# Patient Record
Sex: FEMALE | Race: BLACK OR AFRICAN AMERICAN | NOT HISPANIC OR LATINO | Employment: FULL TIME | URBAN - METROPOLITAN AREA
[De-identification: names, ages, dates, MRNs, and addresses within clinical notes are randomized per-mention and may not be internally consistent; named-entity substitution may affect disease eponyms.]

---

## 2023-03-15 LAB
APPEARANCE, URINE: ABNORMAL
BACTERIA, URINE: ABNORMAL /HPF
BILIRUBIN, URINE: NEGATIVE
BLOOD, URINE: ABNORMAL
COLOR, URINE: YELLOW
GLUCOSE, URINE: NEGATIVE MG/DL
KETONES, URINE: NEGATIVE MG/DL
LEUKOCYTE ESTERASE, URINE: ABNORMAL
NITRITE, URINE: NEGATIVE
PH, URINE: 5 (ref 5–8)
PROTEIN, URINE: NEGATIVE MG/DL
RBC, URINE: <1 /HPF (ref 0–5)
SPECIFIC GRAVITY, URINE: 1.02 (ref 1–1.03)
SQUAMOUS EPITHELIAL CELLS, URINE: 2 /HPF
UROBILINOGEN, URINE: <2 MG/DL (ref 0–1.9)
WBC, URINE: 2 /HPF (ref 0–5)

## 2023-03-16 LAB
CHLAMYDIA TRACH., AMPLIFIED: NEGATIVE
N. GONORRHEA, AMPLIFIED: NEGATIVE
TRICHOMONAS VAGINALIS: NEGATIVE
URINE CULTURE: NORMAL

## 2023-03-23 ENCOUNTER — TELEPHONE (OUTPATIENT)
Dept: PRIMARY CARE | Facility: CLINIC | Age: 38
End: 2023-03-23
Payer: COMMERCIAL

## 2023-03-23 DIAGNOSIS — F40.243 FEAR OF FLYING: Primary | ICD-10-CM

## 2023-03-23 RX ORDER — SEMAGLUTIDE 0.25 MG/.5ML
0.25 INJECTION, SOLUTION SUBCUTANEOUS
COMMUNITY
Start: 2023-01-17 | End: 2023-05-09 | Stop reason: ALTCHOICE

## 2023-03-23 RX ORDER — LORAZEPAM 0.5 MG/1
TABLET ORAL
Qty: 26 TABLET | Refills: 0 | Status: SHIPPED | OUTPATIENT
Start: 2023-03-23 | End: 2023-05-09 | Stop reason: ALTCHOICE

## 2023-03-23 RX ORDER — IBUPROFEN 800 MG/1
800 TABLET ORAL 4 TIMES DAILY
COMMUNITY
Start: 2022-10-10 | End: 2023-05-09 | Stop reason: ALTCHOICE

## 2023-03-23 RX ORDER — LORATADINE 10 MG/1
10 TABLET ORAL DAILY
COMMUNITY
Start: 2019-11-01

## 2023-03-23 RX ORDER — SEMAGLUTIDE 1.34 MG/ML
0.25 INJECTION, SOLUTION SUBCUTANEOUS
COMMUNITY
Start: 2021-04-15 | End: 2023-05-09 | Stop reason: ALTCHOICE

## 2023-03-23 RX ORDER — NEBULIZER AND COMPRESSOR
EACH MISCELLANEOUS
COMMUNITY
Start: 2019-10-21 | End: 2023-05-09 | Stop reason: ALTCHOICE

## 2023-03-23 NOTE — TELEPHONE ENCOUNTER
I sent rx for a few tabs of ativan. Please let pt know. DO NOT Drive for 6 hours after taking ativan. Please let pt know of this. Thanks

## 2023-04-13 ENCOUNTER — TELEPHONE (OUTPATIENT)
Dept: PRIMARY CARE | Facility: CLINIC | Age: 38
End: 2023-04-13
Payer: COMMERCIAL

## 2023-04-13 DIAGNOSIS — J45.40 MODERATE PERSISTENT ASTHMATIC BRONCHITIS WITHOUT COMPLICATION (HHS-HCC): ICD-10-CM

## 2023-04-13 DIAGNOSIS — J01.00 ACUTE NON-RECURRENT MAXILLARY SINUSITIS: Primary | ICD-10-CM

## 2023-04-13 RX ORDER — CODEINE PHOSPHATE AND GUAIFENESIN 10; 100 MG/5ML; MG/5ML
5 SOLUTION ORAL EVERY 6 HOURS PRN
Qty: 240 ML | Refills: 0 | Status: SHIPPED | OUTPATIENT
Start: 2023-04-13 | End: 2023-04-20

## 2023-04-13 RX ORDER — AZITHROMYCIN 250 MG/1
TABLET, FILM COATED ORAL
Qty: 6 TABLET | Refills: 0 | Status: SHIPPED | OUTPATIENT
Start: 2023-04-13 | End: 2023-04-18

## 2023-04-13 RX ORDER — PREDNISONE 10 MG/1
TABLET ORAL
Qty: 42 TABLET | Refills: 0 | Status: SHIPPED | OUTPATIENT
Start: 2023-04-13 | End: 2023-04-25

## 2023-04-13 NOTE — TELEPHONE ENCOUNTER
Pt state that she have had ear and nasal drainage and a cough for 3 week . She states that her asthma is acting up. She tested - for covid 4/11. Please advised

## 2023-05-05 ENCOUNTER — TELEPHONE (OUTPATIENT)
Dept: PRIMARY CARE | Facility: CLINIC | Age: 38
End: 2023-05-05
Payer: COMMERCIAL

## 2023-05-05 NOTE — TELEPHONE ENCOUNTER
Pt states that she is 2 weeks post covid and cannot get rid of cough and she have asthma. She would like to know if there is anything that you can do for her

## 2023-05-08 NOTE — TELEPHONE ENCOUNTER
Pt states that her ins will not cover Symbicort at a reasonable price. She would like to know if you could send a order for budesonide nebulizer

## 2023-05-09 ENCOUNTER — OFFICE VISIT (OUTPATIENT)
Dept: PRIMARY CARE | Facility: CLINIC | Age: 38
End: 2023-05-09
Payer: COMMERCIAL

## 2023-05-09 VITALS
TEMPERATURE: 98 F | SYSTOLIC BLOOD PRESSURE: 160 MMHG | HEIGHT: 66 IN | BODY MASS INDEX: 43.71 KG/M2 | WEIGHT: 272 LBS | DIASTOLIC BLOOD PRESSURE: 84 MMHG | OXYGEN SATURATION: 99 % | HEART RATE: 95 BPM

## 2023-05-09 DIAGNOSIS — J45.41 MODERATE PERSISTENT ASTHMA WITH EXACERBATION (HHS-HCC): Primary | ICD-10-CM

## 2023-05-09 PROBLEM — K57.90 DIVERTICULOSIS: Status: ACTIVE | Noted: 2023-05-09
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
## 2023-05-09 PROBLEM — D64.9 ANEMIA: Status: ACTIVE | Noted: 2023-05-09

## 2023-05-09 PROBLEM — R53.83 FATIGUE: Status: ACTIVE | Noted: 2023-05-09

## 2023-05-09 PROBLEM — M25.569 KNEE PAIN: Status: ACTIVE | Noted: 2023-05-09

## 2023-05-09 PROBLEM — K27.9 PUD (PEPTIC ULCER DISEASE): Status: ACTIVE | Noted: 2023-05-09

## 2023-05-09 PROBLEM — R07.89 CHEST WALL PAIN: Status: ACTIVE | Noted: 2023-05-09

## 2023-05-09 PROBLEM — R11.0 NAUSEA: Status: ACTIVE | Noted: 2023-05-09

## 2023-05-09 PROBLEM — E04.9 GOITER: Status: ACTIVE | Noted: 2023-05-09

## 2023-05-09 PROBLEM — M62.830 BACK MUSCLE SPASM: Status: ACTIVE | Noted: 2023-05-09

## 2023-05-09 PROBLEM — R73.03 PRE-DIABETES: Status: ACTIVE | Noted: 2023-05-09

## 2023-05-09 PROBLEM — R63.5 WEIGHT GAIN: Status: ACTIVE | Noted: 2023-05-09

## 2023-05-09 PROBLEM — B96.89 BACTERIAL SINUSITIS: Status: ACTIVE | Noted: 2023-05-09

## 2023-05-09 PROBLEM — F41.9 ANXIETY: Status: ACTIVE | Noted: 2023-05-09

## 2023-05-09 PROBLEM — K76.0 FATTY LIVER: Status: ACTIVE | Noted: 2023-05-09

## 2023-05-09 PROBLEM — N13.30 HYDRONEPHROSIS OF RIGHT KIDNEY: Status: ACTIVE | Noted: 2023-05-09

## 2023-05-09 PROBLEM — R39.9 SYMPTOMS OF URINARY TRACT INFECTION: Status: ACTIVE | Noted: 2023-05-09

## 2023-05-09 PROBLEM — E88.810 DYSMETABOLIC SYNDROME: Status: ACTIVE | Noted: 2023-05-09

## 2023-05-09 PROBLEM — J32.9 BACTERIAL SINUSITIS: Status: ACTIVE | Noted: 2023-05-09

## 2023-05-09 PROBLEM — J30.9 ALLERGIC RHINITIS: Status: ACTIVE | Noted: 2023-05-09

## 2023-05-09 PROBLEM — M79.10 MUSCLE SORENESS: Status: ACTIVE | Noted: 2023-05-09

## 2023-05-09 PROBLEM — M25.559 JOINT PAIN, HIP: Status: ACTIVE | Noted: 2023-05-09

## 2023-05-09 PROBLEM — D06.9 CIN III WITH SEVERE DYSPLASIA: Status: ACTIVE | Noted: 2023-05-09

## 2023-05-09 PROBLEM — J45.20 MILD INTERMITTENT ASTHMA WITHOUT COMPLICATION (HHS-HCC): Status: ACTIVE | Noted: 2023-05-09

## 2023-05-09 PROBLEM — R87.619 ABNORMAL PAP SMEAR OF CERVIX: Status: ACTIVE | Noted: 2023-05-09

## 2023-05-09 PROBLEM — E78.5 DYSLIPIDEMIA: Status: ACTIVE | Noted: 2023-05-09

## 2023-05-09 PROBLEM — K46.9 ABDOMINAL HERNIA: Status: ACTIVE | Noted: 2023-05-09

## 2023-05-09 PROBLEM — J45.909 ASTHMATIC BRONCHITIS (HHS-HCC): Status: ACTIVE | Noted: 2023-05-09

## 2023-05-09 PROBLEM — R93.5 ABNORMAL ABDOMINAL ULTRASOUND: Status: ACTIVE | Noted: 2023-05-09

## 2023-05-09 PROCEDURE — 99213 OFFICE O/P EST LOW 20 MIN: CPT | Performed by: INTERNAL MEDICINE

## 2023-05-09 PROCEDURE — 1036F TOBACCO NON-USER: CPT | Performed by: INTERNAL MEDICINE

## 2023-05-09 RX ORDER — ALBUTEROL SULFATE 90 UG/1
AEROSOL, METERED RESPIRATORY (INHALATION)
COMMUNITY
End: 2023-10-26 | Stop reason: SDUPTHER

## 2023-05-09 RX ORDER — PREDNISONE 10 MG/1
TABLET ORAL
Qty: 30 TABLET | Refills: 0 | Status: SHIPPED | OUTPATIENT
Start: 2023-05-09 | End: 2023-05-19

## 2023-05-09 NOTE — PROGRESS NOTES
"The patient is here for:   Chief Complaint   Patient presents with    Cough    Breathing Problem        I have reviewed existing histories, notes, past medical history, surgical history, social history, family history, med list, allergy list, and immunization, and updated if applicable.  PCP: Hilda Gilbert MD    HPI:     Got sick middle of April, given Zithromax prednisone and cheratussin.  She did get better but then symptoms started again. No more Fever and chills etc. Just some wheezing, worse with exertion, and mucus stuck in chest.   Cheratussin no longer help    Lab Results   Component Value Date    WBC 13.4 (H) 01/24/2023    HGB 11.0 (L) 01/24/2023    HCT 37.0 01/24/2023     (H) 01/24/2023    CHOL 164 01/18/2023    TRIG 59 01/18/2023    HDL 57.2 01/18/2023    ALT 7 01/18/2023    AST 10 01/18/2023     01/18/2023    K 4.4 01/18/2023     01/18/2023    CREATININE 1.03 01/18/2023    BUN 13 01/18/2023    CO2 27 01/18/2023    TSH 1.05 04/09/2021    INR 1.0 06/27/2022    HGBA1C 5.7 (A) 01/18/2023          Physical exam:  /84   Pulse 95   Temp 36.7 °C (98 °F)   Ht 1.676 m (5' 6\")   Wt 123 kg (272 lb)   SpO2 99%   BMI 43.90 kg/m²    No resp distress   Heart RRR  Lungs no rales  Some wheezing noted      Assessments/orders:   1. Moderate persistent asthma with exacerbation     - predniSONE (Deltasone) 10 mg tablet; Take 5 tablets (50 mg) by mouth once daily for 2 days, THEN 4 tablets (40 mg) once daily for 2 days, THEN 3 tablets (30 mg) once daily for 2 days, THEN 2 tablets (20 mg) once daily for 2 days, THEN 1 tablet (10 mg) once daily for 2 days.  Dispense: 30 tablet; Refill: 0      Plan/discussion and follow up:   As needed.              "

## 2023-05-19 DIAGNOSIS — J45.909 ASTHMATIC BRONCHITIS WITHOUT COMPLICATION, UNSPECIFIED ASTHMA SEVERITY, UNSPECIFIED WHETHER PERSISTENT (HHS-HCC): ICD-10-CM

## 2023-05-19 DIAGNOSIS — J45.41 MODERATE PERSISTENT ASTHMA WITH EXACERBATION (HHS-HCC): ICD-10-CM

## 2023-05-19 PROBLEM — O92.79 CLOGGED DUCT, POSTPARTUM (HHS-HCC): Status: ACTIVE | Noted: 2017-02-08

## 2023-05-19 RX ORDER — FLUTICASONE PROPIONATE 50 MCG
2 SPRAY, SUSPENSION (ML) NASAL 2 TIMES DAILY
COMMUNITY
Start: 2018-08-01 | End: 2023-10-02 | Stop reason: SDUPTHER

## 2023-05-19 RX ORDER — PREDNISONE 10 MG/1
TABLET ORAL
Qty: 30 TABLET | Refills: 0 | Status: CANCELLED | OUTPATIENT
Start: 2023-05-19 | End: 2023-05-29

## 2023-05-19 RX ORDER — BUSPIRONE HYDROCHLORIDE 5 MG/1
5 TABLET ORAL
COMMUNITY
Start: 2018-08-31

## 2023-05-19 RX ORDER — CETIRIZINE HYDROCHLORIDE 10 MG/1
10 TABLET ORAL
COMMUNITY
Start: 2018-10-26 | End: 2024-01-25 | Stop reason: ALTCHOICE

## 2023-05-19 RX ORDER — ONDANSETRON 4 MG/1
1 TABLET, FILM COATED ORAL EVERY 8 HOURS PRN
COMMUNITY
Start: 2016-08-11 | End: 2024-01-25 | Stop reason: ALTCHOICE

## 2023-05-19 RX ORDER — PSEUDOEPHEDRINE HCL 120 MG/1
120 TABLET, FILM COATED, EXTENDED RELEASE ORAL
COMMUNITY
Start: 2018-05-16

## 2023-05-19 RX ORDER — IPRATROPIUM BROMIDE AND ALBUTEROL SULFATE 2.5; .5 MG/3ML; MG/3ML
3 SOLUTION RESPIRATORY (INHALATION) 4 TIMES DAILY
COMMUNITY
Start: 2018-10-29 | End: 2023-10-24 | Stop reason: SDUPTHER

## 2023-05-19 RX ORDER — ALBUTEROL SULFATE 90 UG/1
AEROSOL, METERED RESPIRATORY (INHALATION)
Qty: 18 G | Refills: 1 | Status: CANCELLED | OUTPATIENT
Start: 2023-05-19

## 2023-05-19 RX ORDER — OLOPATADINE HYDROCHLORIDE 665 UG/1
1 SPRAY NASAL 2 TIMES DAILY
COMMUNITY
Start: 2018-08-01 | End: 2024-01-25 | Stop reason: ALTCHOICE

## 2023-05-19 RX ORDER — BUDESONIDE AND FORMOTEROL FUMARATE DIHYDRATE 160; 4.5 UG/1; UG/1
AEROSOL RESPIRATORY (INHALATION)
COMMUNITY
Start: 2021-08-23 | End: 2023-11-16 | Stop reason: WASHOUT

## 2023-10-02 ENCOUNTER — TELEMEDICINE (OUTPATIENT)
Dept: PRIMARY CARE | Facility: CLINIC | Age: 38
End: 2023-10-02
Payer: COMMERCIAL

## 2023-10-02 DIAGNOSIS — J45.41 MODERATE PERSISTENT ASTHMA WITH EXACERBATION (HHS-HCC): Primary | ICD-10-CM

## 2023-10-02 DIAGNOSIS — J30.2 SEASONAL ALLERGIC RHINITIS, UNSPECIFIED TRIGGER: ICD-10-CM

## 2023-10-02 PROCEDURE — 99213 OFFICE O/P EST LOW 20 MIN: CPT | Performed by: INTERNAL MEDICINE

## 2023-10-02 RX ORDER — PREDNISONE 10 MG/1
TABLET ORAL
Qty: 30 TABLET | Refills: 0 | Status: SHIPPED | OUTPATIENT
Start: 2023-10-02 | End: 2023-10-12

## 2023-10-02 RX ORDER — FLUTICASONE PROPIONATE 50 MCG
SPRAY, SUSPENSION (ML) NASAL
Qty: 16 G | Refills: 1 | Status: SHIPPED | OUTPATIENT
Start: 2023-10-02 | End: 2024-01-25 | Stop reason: ALTCHOICE

## 2023-10-02 NOTE — PROGRESS NOTES
PCP: Hilda Gilbert MD   I have reviewed existing histories, notes, past medical history, surgical history, social history, family history, med list, allergy list, and immunization, and updated.    HPI:   For almost two weeks, sx started in nose, Post nasal drainage, now chest, coughing, dry, some wheezing and sob. Not covid per pt. No Fever and chills      Lab Results   Component Value Date    WBC 13.4 (H) 01/24/2023    HGB 11.0 (L) 01/24/2023    HCT 37.0 01/24/2023     (H) 01/24/2023    CHOL 164 01/18/2023    TRIG 59 01/18/2023    HDL 57.2 01/18/2023    ALT 7 01/18/2023    AST 10 01/18/2023     01/18/2023    K 4.4 01/18/2023     01/18/2023    CREATININE 1.03 01/18/2023    BUN 13 01/18/2023    CO2 27 01/18/2023    TSH 1.05 04/09/2021    INR 1.0 06/27/2022    HGBA1C 5.7 (A) 01/18/2023     Physical exam:  There were no vitals taken for this visit.   VV,  Pt shows no acute distress      Assessments/orders:   1. Moderate persistent asthma with exacerbation  predniSONE (Deltasone) 10 mg tablet      2. Seasonal allergic rhinitis, unspecified trigger  fluticasone (Flonase) 50 mcg/actuation nasal spray        See rx.  Mucinex  Follow up prn

## 2023-10-24 DIAGNOSIS — J45.40 MODERATE PERSISTENT ASTHMA WITHOUT COMPLICATION (HHS-HCC): Primary | ICD-10-CM

## 2023-10-24 RX ORDER — IPRATROPIUM BROMIDE AND ALBUTEROL SULFATE 2.5; .5 MG/3ML; MG/3ML
3 SOLUTION RESPIRATORY (INHALATION)
Qty: 810 ML | Refills: 1 | Status: SHIPPED | OUTPATIENT
Start: 2023-10-24 | End: 2024-04-01 | Stop reason: SDUPTHER

## 2023-10-24 NOTE — TELEPHONE ENCOUNTER
PT STATE THAT SHE PRIOR HAD THIS SITUATION AND YOU GAVE HER A STEROID BUT IT IS BACK. SHE FEEL LIKE SHE IS HAVING BAD SINUS INFECTION WITH CHEST INFLAMMATION A SHE WOULD LIKE TO SEE A PULMONOLOGIST      SHE WOULD ALSO LIKE TO GET A REFILL FOR HER ALBUTEROL SOLUTION

## 2023-10-26 ENCOUNTER — TELEPHONE (OUTPATIENT)
Dept: PRIMARY CARE | Facility: CLINIC | Age: 38
End: 2023-10-26
Payer: COMMERCIAL

## 2023-10-26 DIAGNOSIS — J45.40 MODERATE PERSISTENT ASTHMA WITHOUT COMPLICATION (HHS-HCC): Primary | ICD-10-CM

## 2023-10-26 RX ORDER — PREDNISONE 10 MG/1
TABLET ORAL
Qty: 30 TABLET | Refills: 0 | Status: SHIPPED | OUTPATIENT
Start: 2023-10-26 | End: 2023-11-05

## 2023-10-26 RX ORDER — ALBUTEROL SULFATE 90 UG/1
AEROSOL, METERED RESPIRATORY (INHALATION)
Qty: 18 G | Refills: 1 | Status: SHIPPED | OUTPATIENT
Start: 2023-10-26

## 2023-10-26 NOTE — TELEPHONE ENCOUNTER
Pt called to state that she still have a cough that will not get better. She want to know if she can the same treatment that she had earlier this year because that made her feel better. Please advise.

## 2023-11-03 DIAGNOSIS — R63.5 WEIGHT GAIN: ICD-10-CM

## 2023-11-03 DIAGNOSIS — E88.810 DYSMETABOLIC SYNDROME: Primary | ICD-10-CM

## 2023-11-03 DIAGNOSIS — E88.810 DYSMETABOLIC SYNDROME: ICD-10-CM

## 2023-11-03 DIAGNOSIS — R73.03 PRE-DIABETES: ICD-10-CM

## 2023-11-03 RX ORDER — SEMAGLUTIDE 0.25 MG/.5ML
0.25 INJECTION, SOLUTION SUBCUTANEOUS
Qty: 2 ML | Refills: 0 | Status: SHIPPED | OUTPATIENT
Start: 2023-11-03 | End: 2024-01-25 | Stop reason: ALTCHOICE

## 2023-11-03 NOTE — PROGRESS NOTES
Patient ID: Nehal Linton is a 38 y.o. female who presents for No chief complaint on file..  HPI  ***    ROS  Comprehensive review of systems is negative.    Objective   Physical Exam  There were no vitals taken for this visit.   There were no vitals filed for this visit.   There is no height or weight on file to calculate BMI.      ***    Assessment/Plan     ***

## 2023-11-13 ENCOUNTER — APPOINTMENT (OUTPATIENT)
Dept: PULMONOLOGY | Facility: CLINIC | Age: 38
End: 2023-11-13
Payer: COMMERCIAL

## 2023-11-15 ENCOUNTER — APPOINTMENT (OUTPATIENT)
Dept: PRIMARY CARE | Facility: CLINIC | Age: 38
End: 2023-11-15
Payer: COMMERCIAL

## 2023-11-16 ENCOUNTER — PATIENT MESSAGE (OUTPATIENT)
Dept: PRIMARY CARE | Facility: CLINIC | Age: 38
End: 2023-11-16

## 2023-11-16 ENCOUNTER — OFFICE VISIT (OUTPATIENT)
Dept: PULMONOLOGY | Facility: HOSPITAL | Age: 38
End: 2023-11-16
Payer: COMMERCIAL

## 2023-11-16 VITALS
HEIGHT: 67 IN | TEMPERATURE: 95.8 F | BODY MASS INDEX: 42.59 KG/M2 | WEIGHT: 271.36 LBS | DIASTOLIC BLOOD PRESSURE: 95 MMHG | RESPIRATION RATE: 16 BRPM | OXYGEN SATURATION: 98 % | HEART RATE: 96 BPM | SYSTOLIC BLOOD PRESSURE: 137 MMHG

## 2023-11-16 DIAGNOSIS — J45.41 MODERATE PERSISTENT ASTHMA WITH EXACERBATION (HHS-HCC): Primary | ICD-10-CM

## 2023-11-16 DIAGNOSIS — J30.2 SEASONAL ALLERGIC RHINITIS, UNSPECIFIED TRIGGER: ICD-10-CM

## 2023-11-16 DIAGNOSIS — J45.40 MODERATE PERSISTENT ASTHMA WITHOUT COMPLICATION (HHS-HCC): ICD-10-CM

## 2023-11-16 DIAGNOSIS — R05.3 CHRONIC COUGH: Primary | ICD-10-CM

## 2023-11-16 PROCEDURE — 1036F TOBACCO NON-USER: CPT | Performed by: INTERNAL MEDICINE

## 2023-11-16 PROCEDURE — 99204 OFFICE O/P NEW MOD 45 MIN: CPT | Performed by: INTERNAL MEDICINE

## 2023-11-16 PROCEDURE — 99214 OFFICE O/P EST MOD 30 MIN: CPT | Performed by: INTERNAL MEDICINE

## 2023-11-16 RX ORDER — FLUTICASONE FUROATE 100 UG/1
1 POWDER RESPIRATORY (INHALATION) DAILY
Qty: 1 EACH | Refills: 11 | Status: SHIPPED | OUTPATIENT
Start: 2023-11-16 | End: 2024-04-01 | Stop reason: ALTCHOICE

## 2023-11-16 RX ORDER — PREDNISONE 20 MG/1
40 TABLET ORAL DAILY
Qty: 10 TABLET | Refills: 0 | Status: SHIPPED | OUTPATIENT
Start: 2023-11-16 | End: 2023-11-21

## 2023-11-16 NOTE — PROGRESS NOTES
PULMONARY CONSULT INITIAL VISIT      REASON FOR CONSULT:  I have been asked by  to see this patient, Nehal Linton, in consultation for evaluation of persistent cough, history of asthma    Chief Complaint:    Chief Complaint   Patient presents with    Shortness of Breath     Patient is here for initial visit. Patient states she get shortness of breath with exertion and rest. Patient states she has been sick since weather changed. Patient states both dry and productive cough makes chest feel tight. Patient states she has rescue inhaler uses daily and has nebulizer up 2x daily. Patient does not smoke.        History of Present Illness:  Nehal Linton is an 38 y.o. female with chronic cough her whole life and has been told it was asthma.  Has needed 2 rounds of steroids this year.  She was on symbicort in the past but her insurance denied it, so she has only been on albuterol for the last year.   This seems to be worse in spring and fall with weather changes.  Although the cough never completely goes away.  She does have seasonal allergies.  She is on flonase but does not take it persistently because it causes headaches.  Uses claritin everyday.  Never smoker.  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  No occupational exposures.  No f/c.        Past Medical History:   Past Medical History:   Diagnosis Date    Allergic     Asthma     Atypical squamous cells of undetermined significance on cytologic smear of cervix (ASC-US) 09/21/2020    ASCUS with positive high risk HPV cervical    Cough, unspecified 02/24/2014    Cough    Encounter for insertion of intrauterine contraceptive device 09/05/2014    Encounter for IUD insertion    Encounter for removal of intrauterine contraceptive device 10/22/2020    Encounter for IUD removal    Personal history of other diseases of the respiratory system 05/13/2020    History of acute sinusitis    Personal history of other diseases of the respiratory system 02/06/2014    Personal history  of acute sinusitis    Personal history of urinary (tract) infections 11/22/2013    Personal history of urinary tract infection       Past Surgical History:    Past Surgical History:   Procedure Laterality Date    HERNIA REPAIR  6/2017    OTHER SURGICAL HISTORY  12/22/2020    Loop electrosurgical excision procedure    OTHER SURGICAL HISTORY  11/23/2020    Tubal ligation bilateral    OTHER SURGICAL HISTORY  02/14/2019    Hernia repair    TUBAL LIGATION  2020    US GUIDED ABSCESS DRAIN  06/28/2022    US GUIDED ABSCESS DRAIN 6/28/2022 [facility]       Social History:  Social History     Socioeconomic History    Marital status: Single     Spouse name: Not on file    Number of children: Not on file    Years of education: Not on file    Highest education level: Not on file   Occupational History    Not on file   Tobacco Use    Smoking status: Never    Smokeless tobacco: Never   Substance and Sexual Activity    Alcohol use: Not Currently    Drug use: Never    Sexual activity: Yes     Partners: Male     Birth control/protection: Other     Comment: Tubes tied   Other Topics Concern    Not on file   Social History Narrative    Not on file     Social Determinants of Health     Financial Resource Strain: Not on file   Food Insecurity: Not on file   Transportation Needs: Not on file   Physical Activity: Not on file   Stress: Not on file   Social Connections: Not on file   Intimate Partner Violence: Not on file   Housing Stability: Not on file       Family History:  family history includes Diabetes in her father; Heart disease in her father.    Allergies:  is allergic to amoxicillin-pot clavulanate and sulfa (sulfonamide antibiotics).    Home Medications:      Current Outpatient Medications:     albuterol 90 mcg/actuation inhaler, inhale 1 to 2 puffs by mouth every 4 to 6 hours as needed, Disp: 18 g, Rfl: 1    busPIRone (Buspar) 5 mg tablet, Take 1 tablet (5 mg) by mouth once daily., Disp: , Rfl:     cetirizine (ZyrTEC) 10 mg  "tablet, Take 1 tablet (10 mg) by mouth once daily., Disp: , Rfl:     fluticasone (Flonase) 50 mcg/actuation nasal spray, Two sprays each nostril qd, Disp: 16 g, Rfl: 1    fluticasone furoate (Arnuity Ellipta) 100 mcg/actuation inhaler, Inhale 1 puff once daily. Rinse mouth with water after use to reduce aftertaste and incidence of candidiasis. Do not swallow., Disp: 1 each, Rfl: 11    ipratropium-albuteroL (Duo-Neb) 0.5-2.5 mg/3 mL nebulizer solution, Take 3 mL by nebulization 3 times a day., Disp: 810 mL, Rfl: 1    loratadine (Claritin) 10 mg tablet, Take 1 tablet (10 mg) by mouth once daily., Disp: , Rfl:     olopatadine (Patanase) 0.6 % spray,non-aerosol nasal spray, Administer 1 spray into affected nostril(s) twice a day., Disp: , Rfl:     ondansetron (Zofran) 4 mg tablet, Take 1 tablet (4 mg) by mouth every 8 hours if needed., Disp: , Rfl:     predniSONE (Deltasone) 20 mg tablet, Take 2 tablets (40 mg) by mouth once daily for 5 days., Disp: 10 tablet, Rfl: 0    PRENATAL VIT 7-IRON-FOLIC-DHA ORAL, Take 1 tablet by mouth once daily., Disp: , Rfl:     pseudoephedrine ER (Sudafed-12 Hour) 120 mg 12 hr tablet, Take 1 tablet (120 mg) by mouth., Disp: , Rfl:     semaglutide, weight loss, (Wegovy) 0.25 mg/0.5 mL pen injector, Inject 0.25 mg under the skin every 7 days., Disp: 2 mL, Rfl: 0    I have reviewed the past medical history, past surgical history, family history, social history, as noted above, and review of systems as noted above.    Review of Systems:  Pertinent items are noted in HPI.  12 point review of systems reviewed and are negative except for as mentioned in HPI.        OBJECTIVE:    Vitals:    BP (!) 137/95   Pulse 96   Temp 35.4 °C (95.8 °F)   Resp 16   Ht 1.702 m (5' 7\")   Wt 123 kg (271 lb 5.8 oz)   SpO2 98% Comment: RA  BMI 42.50 kg/m²     General Appearance:  Alert, cooperative, no distress or conversational dyspnea, appears stated age  Head:  Normocephalic, without obvious abnormality, " atraumatic  Eyes:  PERRL, conjunctiva/corneas clear, EOM's intact  Nose:  Nares normal, septum midline, mucosa normal, no drainage or sinus tenderness  Mouth:  Lips, mucosa, and tongue normal; teeth and gums normal, no thrush  Neck:  Supple, symmetrical, trachea midline, no cervical, supraclavicular, and axillary adenopathy; no JVD, not using accessory muscles to breath  Lungs:    Clear to auscultation bilaterally, respirations unlabored, good air movement  Chest wall:  No tenderness or deformity, chest expands symmetrically  Heart:  Regular rate and rhythm, S1 and S2 normal, no murmur, rub or gallop  Abdomen:    Soft, non-tender, non-distended, bs active, no masses, no organomegaly  Extremities:  Extremities atraumatic, no edema, no cyanosis or clubbing, 2+ pulses in all extremities  Skin:  Skin color, texture, turgor normal, no rashes or lesions  Neurologic:  CNII-XII intact. Normal strength, sensation      Data:    Labs reviewed in [facility]      Imaging:  Reviewed    [unfilled]  No images are attached to the encounter.      ASSESSMENT  Chief Complaint   Patient presents with    Shortness of Breath     Patient is here for initial visit. Patient states she get shortness of breath with exertion and rest. Patient states she has been sick since weather changed. Patient states both dry and productive cough makes chest feel tight. Patient states she has rescue inhaler uses daily and has nebulizer up 2x daily. Patient does not smoke.      1. Moderate persistent asthma without complication  Referral to Pulmonology        Poorly controlled allergic asthma since insurance denied Symbicort a year ago, since then has only been on albuterol and having persistent harsh cough and required 2 rounds of steroids but worse again.    Allergic rhinitis- seasonal but does not know what she is allergic to      PLAN    -will start Arnuity 100 which her insurance seems to be ok with, 1 puff once a day  -continue to use albuterol as  needed  -will give 5 days of prednisone 40mg daily to get cough/asthma flair under control as we start the ICS  -PFTs ordered  -instructed patient to try using nasacort 2 sprays in each nostril bid instead of flonase which has been giving her headaches.  She can continue claritin but may be able to stop that if allergies are under good control with nasacort    -follow up with pulmonary NP in 2 months      No orders of the defined types were placed in this encounter.      I have spent a total of 50 minutes including reviewing the chart, documenting, viewing imaging, seeing the patient, and discussing the treatment plan and diagnosis with the patient.    New 30-44, 45-59, 60-74      Established 20-29, 30-39, 40-54    Mike Angela MD  11/16/2023  2:54 PM

## 2023-11-16 NOTE — TELEPHONE ENCOUNTER
From: Nehal Linton  To: Hilda Gilbert MD  Sent: 11/16/2023 2:45 PM EST  Subject: ENT Referral    Hello!    I had an appointment with the Pulmonary Specialist. They are suggesting allergy related issues with the cough.  Are you able to send a referral for an ENT specialist?    Thanks,    Nehal Linton

## 2023-11-16 NOTE — PATIENT INSTRUCTIONS
It sounds like you do have asthma and allergic rhinitis and they have not been well controlled, which has led to your persistent harsh cough.      -start using nasacort 2 sprays in each nostril twice a day, can stop claritin once you feel like your allergies are under good control  -we will start a new steroid inhaler called Arnuity 100 which will be one puff every morning, you can still use albuterol as needed.  -I have ordered 5 days of prednisone to get your asthma under control until the inhaler can start working  -we will schedule lung function tests (PFTs)    -follow up with pulmonary NP in 2 months

## 2023-11-17 ENCOUNTER — APPOINTMENT (OUTPATIENT)
Dept: PULMONOLOGY | Facility: CLINIC | Age: 38
End: 2023-11-17
Payer: COMMERCIAL

## 2023-11-30 ENCOUNTER — TELEPHONE (OUTPATIENT)
Dept: GASTROENTEROLOGY | Facility: HOSPITAL | Age: 38
End: 2023-11-30
Payer: COMMERCIAL

## 2023-11-30 DIAGNOSIS — J01.90 SUBACUTE SINUSITIS, UNSPECIFIED LOCATION: Primary | ICD-10-CM

## 2023-11-30 RX ORDER — DOXYCYCLINE HYCLATE 100 MG
100 TABLET ORAL 2 TIMES DAILY
Qty: 20 TABLET | Refills: 0 | Status: SHIPPED | OUTPATIENT
Start: 2023-11-30 | End: 2023-12-10

## 2023-11-30 NOTE — TELEPHONE ENCOUNTER
I called the patient as she is complaining of continued cough despite her breathing/asthma symptoms feeling much better.  The cough did not improve with steroids.  No f/c but feels like she has sinus congestion, post nasal drip, congestion in her ears.  We will treat for sinusitis which could be causing continued post nasal drip which is causing the cough and is not responding to treatment of allergies and asthma.  Will try 10 days of doxycycline which I have ordered.    Mike Angela MD  11/30/2023  4:04 PM

## 2023-12-14 ENCOUNTER — OFFICE VISIT (OUTPATIENT)
Dept: OTOLARYNGOLOGY | Facility: CLINIC | Age: 38
End: 2023-12-14
Payer: COMMERCIAL

## 2023-12-14 VITALS — HEIGHT: 67 IN | BODY MASS INDEX: 42.53 KG/M2 | WEIGHT: 271 LBS | TEMPERATURE: 97.5 F

## 2023-12-14 DIAGNOSIS — J31.0 CHRONIC RHINITIS: Primary | ICD-10-CM

## 2023-12-14 DIAGNOSIS — J30.2 SEASONAL ALLERGIC RHINITIS, UNSPECIFIED TRIGGER: ICD-10-CM

## 2023-12-14 DIAGNOSIS — R05.3 CHRONIC COUGH: ICD-10-CM

## 2023-12-14 PROCEDURE — 1036F TOBACCO NON-USER: CPT | Performed by: OTOLARYNGOLOGY

## 2023-12-14 PROCEDURE — 31231 NASAL ENDOSCOPY DX: CPT | Performed by: OTOLARYNGOLOGY

## 2023-12-14 PROCEDURE — 99203 OFFICE O/P NEW LOW 30 MIN: CPT | Performed by: OTOLARYNGOLOGY

## 2023-12-14 RX ORDER — MOMETASONE FUROATE 100 %
POWDER (GRAM) MISCELLANEOUS
Qty: 60 G | Refills: 2 | Status: SHIPPED | OUTPATIENT
Start: 2023-12-14 | End: 2024-04-01 | Stop reason: ALTCHOICE

## 2023-12-14 RX ORDER — AZELASTINE 1 MG/ML
2 SPRAY, METERED NASAL 2 TIMES DAILY
Qty: 30 ML | Refills: 3 | Status: SHIPPED | OUTPATIENT
Start: 2023-12-14 | End: 2024-01-13

## 2023-12-14 ASSESSMENT — PATIENT HEALTH QUESTIONNAIRE - PHQ9
1. LITTLE INTEREST OR PLEASURE IN DOING THINGS: NOT AT ALL
2. FEELING DOWN, DEPRESSED OR HOPELESS: NOT AT ALL
SUM OF ALL RESPONSES TO PHQ9 QUESTIONS 1 AND 2: 0

## 2023-12-14 NOTE — PROGRESS NOTES
"Chief Complaint:  Nasal drainage    Referring Provider: Hilda Gilbert MD    History of Present Illness:    Nehal Linton is a 38 y.o. female, presenting for evaluation of postnasal drainage and nasal congestion.  She has seen pulmonology for dyspnea and asthma symptoms and was treated with oral steroids.  She states that the oral steroids helped her breathing symptoms but did not improve her sinonasal symptoms.  She states that she has been having dysphonia and a productive cough.  She states that her singing voice has not been normal for some time now.  She is concerned that postnasal drip which she is constantly aware of is dripping into her lower airway and causing her to have difficulty with singing.  She has tried topical nasal steroids like fluticasone in the past as well as over-the-counter cough and cold medicine however she has not experienced significant improvement.  She has never had imaging or endoscopic sinus surgery in the past.  A complete review of systems was performed and was negative except for as stated in the history of present illness.    ?  Review of Systems:    Review of symptoms was negative except for those stated including Cardiopulmonary, Genitourinary, Gastrointestinal, Psychological, Sleep pattern, Endocrine, Eyes, Neurologic, Musculoskeletal, Skin, Hematologic/Lymphatic and Allergic/Immunologic.     Medical History:    I have reviewed the patient's updated past medical history, surgical history, family history, social history, as well as current medications and allergies as of 12/14/2023. Changes to these items have been updated and marked as reviewed in the electronic medical record.    Physical Exam:    Vitals:  height is 1.702 m (5' 7\") and weight is 123 kg (271 lb). Her temperature is 36.4 °C (97.5 °F).   General: Patient doing well overall and is in no apparent distress.  Psych: Pleasant affect, and answers questions appropriately.  Head & Face: Symmetric facial movements  Eyes: " Pupils equal, round, reactive.  Extraocular movements intact without gaze restrictions or nystagmus. No epiphora.  Ears:  External auditory canals are normal.  Tympanic membranes are clear.  No middle ear effusion is seen.  All middle ear landmarks are normal.  Nose: Anterior rhinoscopy revealed normal sinonasal mucosa. More posterior areas of the nasal cavity could not be completely examined.  Oral Cavity/Oropharynx:  Without lesions or masses to visual exam.  Neck: Supple without lymphadenopathy.  Lungs: Non-labored, and without evidence of stridor.  Cardiac: Pulses are strong, well-perfused.  Extremities: Without gross evidence of clubbing, cyanosis, or edema.  Neuro: Cranial nerves II-XII grossly intact; Intact facial movements.    Procedure:  Rigid nasal endoscopy (01238)  Pre-procedure diagnosis/Indication for procedure:  To evaluate areas not visualized on anterior rhinoscopy   Anesthesia:  1% phenylephrine and 4% lidocaine topical spray   Description:  A 0-degree 4-mm rigid nasal endoscope was used to examine the left and right nasal cavities.  The nasal valve areas were examined for abnormalities or collapse.  The inferior and middle turbinates were evaluated.  The middle and superior meatuses, and the sphenoethmoid recesses were examined and inspected for mucopurulence and polyps. Once the endoscope was withdrawn, the patient was noted to have tolerated the procedure well without complications and was returned to ambulatory status.    Findings:    There is moderate inferior turbinate hypertrophy noted bilaterally.  There is some thick mucus along the nasal floor and some frothy secretions within the nasopharynx however there is no mucopurulence emanating from the middle meatus bilaterally.  The middle meatus and infra meatus both look fairly normal.  No polyposis lesions or other findings.      Assessment:     Nehal Linton is a 38 y.o. female with chronic rhinitis    Plan:      [place] certainly has  evidence for some rhinitis based on her endoscopic examination however her dyspnea and chronic cough particularly her productive cough are not necessarily consistent with a sinonasal etiology.  I would like to trial her on mometasone irrigations and topical Astelin and see her back with a CT of her sinuses as she has never had any dedicated imaging.  If she has chronic sinusitis that is leading to aspiration of nasal secretions we will see on her CT.  Follow-up in 8 weeks.      Angel Gonzalez MD, M.Eng.   of Otolaryngology - Head & Neck Surgery  Division of Rhinology and Endoscopic Skull Base Surgery  [facility]

## 2023-12-14 NOTE — LETTER
December 16, 2023     Hilda Gilbert MD  [address]  [facility] [address]    Patient: Nehal Linton   YOB: 1985   Date of Visit: 12/14/2023       Dear Dr. Hilda Gilbert MD:    Thank you for referring Nehal Linton to me for evaluation. Below are my notes for this consultation.  If you have questions, please do not hesitate to call me. I look forward to following your patient along with you.       Sincerely,     Angel Gonzalez MD      CC: No Recipients  ______________________________________________________________________________________    Chief Complaint:  Nasal drainage    Referring Provider: Hilda Gilbert MD    History of Present Illness:    Nehal Linton is a 38 y.o. female, presenting for evaluation of postnasal drainage and nasal congestion.  She has seen pulmonology for dyspnea and asthma symptoms and was treated with oral steroids.  She states that the oral steroids helped her breathing symptoms but did not improve her sinonasal symptoms.  She states that she has been having dysphonia and a productive cough.  She states that her singing voice has not been normal for some time now.  She is concerned that postnasal drip which she is constantly aware of is dripping into her lower airway and causing her to have difficulty with singing.  She has tried topical nasal steroids like fluticasone in the past as well as over-the-counter cough and cold medicine however she has not experienced significant improvement.  She has never had imaging or endoscopic sinus surgery in the past.  A complete review of systems was performed and was negative except for as stated in the history of present illness.       Review of Systems:    Review of symptoms was negative except for those stated including Cardiopulmonary, Genitourinary, Gastrointestinal, Psychological, Sleep pattern, Endocrine, Eyes, Neurologic, Musculoskeletal, Skin, Hematologic/Lymphatic and  "Allergic/Immunologic.     Medical History:    I have reviewed the patient's updated past medical history, surgical history, family history, social history, as well as current medications and allergies as of 12/14/2023. Changes to these items have been updated and marked as reviewed in the electronic medical record.    Physical Exam:    Vitals:  height is 1.702 m (5' 7\") and weight is 123 kg (271 lb). Her temperature is 36.4 °C (97.5 °F).   General: Patient doing well overall and is in no apparent distress.  Psych: Pleasant affect, and answers questions appropriately.  Head & Face: Symmetric facial movements  Eyes: Pupils equal, round, reactive.  Extraocular movements intact without gaze restrictions or nystagmus. No epiphora.  Ears:  External auditory canals are normal.  Tympanic membranes are clear.  No middle ear effusion is seen.  All middle ear landmarks are normal.  Nose: Anterior rhinoscopy revealed normal sinonasal mucosa. More posterior areas of the nasal cavity could not be completely examined.  Oral Cavity/Oropharynx:  Without lesions or masses to visual exam.  Neck: Supple without lymphadenopathy.  Lungs: Non-labored, and without evidence of stridor.  Cardiac: Pulses are strong, well-perfused.  Extremities: Without gross evidence of clubbing, cyanosis, or edema.  Neuro: Cranial nerves II-XII grossly intact; Intact facial movements.    Procedure:  Rigid nasal endoscopy (95352)  Pre-procedure diagnosis/Indication for procedure:  To evaluate areas not visualized on anterior rhinoscopy   Anesthesia:  1% phenylephrine and 4% lidocaine topical spray   Description:  A 0-degree 4-mm rigid nasal endoscope was used to examine the left and right nasal cavities.  The nasal valve areas were examined for abnormalities or collapse.  The inferior and middle turbinates were evaluated.  The middle and superior meatuses, and the sphenoethmoid recesses were examined and inspected for mucopurulence and polyps. Once the " endoscope was withdrawn, the patient was noted to have tolerated the procedure well without complications and was returned to ambulatory status.    Findings:    There is moderate inferior turbinate hypertrophy noted bilaterally.  There is some thick mucus along the nasal floor and some frothy secretions within the nasopharynx however there is no mucopurulence emanating from the middle meatus bilaterally.  The middle meatus and infra meatus both look fairly normal.  No polyposis lesions or other findings.      Assessment:     Nehal Linton is a 38 y.o. female with chronic rhinitis    Plan:      Balbina certainly has evidence for some rhinitis based on her endoscopic examination however her dyspnea and chronic cough particularly her productive cough are not necessarily consistent with a sinonasal etiology.  I would like to trial her on mometasone irrigations and topical Astelin and see her back with a CT of her sinuses as she has never had any dedicated imaging.  If she has chronic sinusitis that is leading to aspiration of nasal secretions we will see on her CT.  Follow-up in 8 weeks.      Angel Gonzalez MD, M.Eng.   of Otolaryngology - Head & Neck Surgery  Division of Rhinology and Endoscopic Skull Base Surgery  [facility]

## 2024-01-11 ENCOUNTER — TELEMEDICINE (OUTPATIENT)
Dept: PRIMARY CARE | Facility: CLINIC | Age: 39
End: 2024-01-11
Payer: COMMERCIAL

## 2024-01-11 DIAGNOSIS — J98.01 COUGH DUE TO BRONCHOSPASM: Primary | ICD-10-CM

## 2024-01-11 PROCEDURE — 99213 OFFICE O/P EST LOW 20 MIN: CPT | Performed by: NURSE PRACTITIONER

## 2024-01-11 RX ORDER — PREDNISONE 20 MG/1
40 TABLET ORAL DAILY
Qty: 10 TABLET | Refills: 0 | Status: SHIPPED | OUTPATIENT
Start: 2024-01-11 | End: 2024-01-16

## 2024-01-11 ASSESSMENT — ENCOUNTER SYMPTOMS
FATIGUE: 0
FEVER: 0
CHILLS: 0

## 2024-01-11 NOTE — PROGRESS NOTES
"Subjective   Patient ID: Nehal Linton is a 38 y.o. female who presents for URI.  Five days ago felt ill after air travel - thought it was vertigo, following ST, cough  Neb 3x per day can feel \"rattling\" in chest, productive after neb.  No fever, no body aches, can function as usual activities despite harsh persistent cough.  Recently saw ENT for more dedicated evaluation and management, regimen changed.        Review of Systems   Constitutional:  Negative for chills, fatigue and fever.   Respiratory:  Positive for cough. Negative for shortness of breath and wheezing.    Neurological:  Negative for headaches.       Objective   Physical Exam  Constitutional:       General: She is not in acute distress.     Appearance: Normal appearance. She is not ill-appearing or toxic-appearing.   Pulmonary:      Effort: Pulmonary effort is normal.      Comments: No conversational dyspnea  Neurological:      Mental Status: She is alert.         Assessment/Plan   Diagnoses and all orders for this visit:  Cough due to bronchospasm  -     predniSONE (Deltasone) 20 mg tablet; Take 2 tablets (40 mg) by mouth once daily for 5 days.           ADONIS Moreno-CNP 01/11/24 2:36 PM   "

## 2024-01-12 PROBLEM — J98.01 COUGH DUE TO BRONCHOSPASM: Status: ACTIVE | Noted: 2024-01-12

## 2024-01-12 ASSESSMENT — ENCOUNTER SYMPTOMS
WHEEZING: 0
HEADACHES: 0
SHORTNESS OF BREATH: 0
COUGH: 1

## 2024-01-12 NOTE — ASSESSMENT & PLAN NOTE
Reviewed pertinent records, hx and limited exam are consistent with mild exacerbation of asthma in form of cough d/t bronchospasm.   Will add steroid burst, follow with pulm by phone on Monday to re-establish regimen.  Reviewed symptoms to report in the interim that require urgent re-evaluation in person.

## 2024-01-25 ENCOUNTER — APPOINTMENT (OUTPATIENT)
Dept: PULMONOLOGY | Facility: HOSPITAL | Age: 39
End: 2024-01-25
Payer: COMMERCIAL

## 2024-01-25 ENCOUNTER — PATIENT MESSAGE (OUTPATIENT)
Dept: PRIMARY CARE | Facility: CLINIC | Age: 39
End: 2024-01-25

## 2024-01-25 DIAGNOSIS — R63.5 WEIGHT GAIN: ICD-10-CM

## 2024-01-25 DIAGNOSIS — E88.810 DYSMETABOLIC SYNDROME: ICD-10-CM

## 2024-01-25 DIAGNOSIS — E66.01 MORBID OBESITY (MULTI): Primary | ICD-10-CM

## 2024-01-25 RX ORDER — TIRZEPATIDE 2.5 MG/.5ML
2.5 INJECTION, SOLUTION SUBCUTANEOUS
Qty: 4 EACH | Refills: 0 | Status: SHIPPED | OUTPATIENT
Start: 2024-01-25 | End: 2024-01-25

## 2024-01-25 NOTE — TELEPHONE ENCOUNTER
States the Wegovy is still on back order, is there any other medication? Wants to know about Zepbound, please advise.

## 2024-01-25 NOTE — TELEPHONE ENCOUNTER
From: Nehal Linton  To: Hilda Gilbert MD  Sent: 1/25/2024 3:09 PM EST  Subject: Zepbound    Hi!  I have been trying to get Wegovy since last year and it is still on backorder.  I called [facility] (my insurance provider) and they said they have added Zepbound to their list of medications for weight loss. (Prior authorization required)    My asthma will only get better if I lose this weight. After so many rounds of steroids, it’s only resulted in more weight gain.     Is this something that you can prescribe?

## 2024-02-06 ENCOUNTER — HOSPITAL ENCOUNTER (OUTPATIENT)
Dept: RADIOLOGY | Facility: CLINIC | Age: 39
Discharge: HOME | End: 2024-02-06
Payer: COMMERCIAL

## 2024-02-06 DIAGNOSIS — J31.0 CHRONIC RHINITIS: ICD-10-CM

## 2024-02-06 PROCEDURE — 70486 CT MAXILLOFACIAL W/O DYE: CPT

## 2024-02-22 ENCOUNTER — TELEPHONE (OUTPATIENT)
Dept: PRIMARY CARE | Facility: CLINIC | Age: 39
End: 2024-02-22
Payer: COMMERCIAL

## 2024-02-22 DIAGNOSIS — T75.3XXD MOTION SICKNESS, SUBSEQUENT ENCOUNTER: Primary | ICD-10-CM

## 2024-02-22 NOTE — TELEPHONE ENCOUNTER
"Pt states she needs her thyroid checked every 6 months per your advice but wants to know if she can discuss that with you at her appt on 3/18. Pt also stated she took her last tirzepatide 2.5mg and states \"its time to increase dose to 5.0\" if you could do so? :) pts also states she is going on a cruise and would like to know if you can prescribe her some anti nausea meds or patches for it. Thank you!  "

## 2024-02-23 DIAGNOSIS — R73.03 PRE-DIABETES: Primary | ICD-10-CM

## 2024-02-23 RX ORDER — SCOLOPAMINE TRANSDERMAL SYSTEM 1 MG/1
1 PATCH, EXTENDED RELEASE TRANSDERMAL
Qty: 10 PATCH | Refills: 0 | Status: SHIPPED | OUTPATIENT
Start: 2024-02-23 | End: 2024-04-01 | Stop reason: ALTCHOICE

## 2024-03-18 ENCOUNTER — APPOINTMENT (OUTPATIENT)
Dept: PRIMARY CARE | Facility: CLINIC | Age: 39
End: 2024-03-18
Payer: COMMERCIAL

## 2024-03-27 DIAGNOSIS — R73.03 PRE-DIABETES: Primary | ICD-10-CM

## 2024-04-01 ENCOUNTER — OFFICE VISIT (OUTPATIENT)
Dept: PRIMARY CARE | Facility: CLINIC | Age: 39
End: 2024-04-01
Payer: COMMERCIAL

## 2024-04-01 VITALS
BODY MASS INDEX: 40.02 KG/M2 | WEIGHT: 249 LBS | TEMPERATURE: 98.3 F | HEART RATE: 78 BPM | HEIGHT: 66 IN | SYSTOLIC BLOOD PRESSURE: 117 MMHG | DIASTOLIC BLOOD PRESSURE: 64 MMHG | OXYGEN SATURATION: 99 %

## 2024-04-01 DIAGNOSIS — Z12.31 ENCOUNTER FOR SCREENING MAMMOGRAM FOR MALIGNANT NEOPLASM OF BREAST: ICD-10-CM

## 2024-04-01 DIAGNOSIS — E04.9 GOITER: ICD-10-CM

## 2024-04-01 DIAGNOSIS — Z00.00 PHYSICAL EXAM: Primary | ICD-10-CM

## 2024-04-01 DIAGNOSIS — J45.40 MODERATE PERSISTENT ASTHMA WITHOUT COMPLICATION (HHS-HCC): ICD-10-CM

## 2024-04-01 DIAGNOSIS — Z11.59 NEED FOR HEPATITIS C SCREENING TEST: ICD-10-CM

## 2024-04-01 PROBLEM — E66.01 SEVERE OBESITY (MULTI): Status: ACTIVE | Noted: 2024-04-01

## 2024-04-01 PROCEDURE — 1036F TOBACCO NON-USER: CPT | Performed by: INTERNAL MEDICINE

## 2024-04-01 PROCEDURE — 99395 PREV VISIT EST AGE 18-39: CPT | Performed by: INTERNAL MEDICINE

## 2024-04-01 RX ORDER — IPRATROPIUM BROMIDE AND ALBUTEROL SULFATE 2.5; .5 MG/3ML; MG/3ML
3 SOLUTION RESPIRATORY (INHALATION)
Qty: 810 ML | Refills: 1 | Status: SHIPPED | OUTPATIENT
Start: 2024-04-01 | End: 2025-04-01

## 2024-04-01 NOTE — PROGRESS NOTES
"SUBJECTIVE:   Nehal Linton is a 38 y.o. female presenting for her annual physical/wellness.  PCP: Hilda Gilbert MD  Physical. She is doing well, no concerns.  Lost over 20 lbs on zepbound that she started last month.  No nausea.  No other symptoms    Hx of anemia, likely from heavy menses  Pre-dm, likely improved.  Hx of goiter, will check thyroid function.      Colon screening:  na  Last pap: Up to date   Last mammogram: na  Dexa na  Vaccines suggested HPV. She will consider.   Diet:   healthy in general  Exercises:  regularly  Lab Results   Component Value Date    HGBA1C 5.7 (A) 01/18/2023     Lab Results   Component Value Date    CREATININE 1.03 01/18/2023     Lab Results   Component Value Date    WBC 13.4 (H) 01/24/2023    HGB 11.0 (L) 01/24/2023    HCT 37.0 01/24/2023    MCV 78 (L) 01/24/2023     (H) 01/24/2023     Lab Results   Component Value Date    CHOL 164 01/18/2023    CHOL 182 03/02/2019     Lab Results   Component Value Date    HDL 57.2 01/18/2023    HDL 49.0 03/02/2019     No results found for: \"LDLCALC\"  Lab Results   Component Value Date    TRIG 59 01/18/2023    TRIG 73 03/02/2019     No components found for: \"CHOLHDL\"       ROS:   Feeling well. No dyspnea or chest pain on exertion. No abdominal pain, change in bowel habits, black or bloody stools. No urinary tract or  symptoms.  No breast concerns. No neurological complaints.    OBJECTIVE:   The patient appears well, alert, oriented x 3, in no distress.   /64   Pulse 78   Temp 36.8 °C (98.3 °F)   Ht 1.683 m (5' 6.25\")   Wt 113 kg (249 lb)   SpO2 99%   BMI 39.89 kg/m²   ENT normal.  Neck supple. No adenopathy. Mild goiter. MACARIO. Lungs are clear, good air entry, no wheezes, rhonchi or rales. S1 and S2 normal, no murmurs, regular rate and rhythm. Abdomen is soft without tenderness, guarding, mass or organomegaly.  exam deferred to Gyn. Extremities show no edema, normal peripheral pulses. Neurological is normal without focal " findings.      1. Physical exam  Hemoglobin A1C, Lipid Panel, Comprehensive Metabolic Panel      2. Moderate persistent asthma without complication  ipratropium-albuteroL (Duo-Neb) 0.5-2.5 mg/3 mL nebulizer solution      3. Need for hepatitis C screening test  Hepatitis C Antibody      4. Encounter for screening mammogram for malignant neoplasm of breast  BI mammo bilateral screening tomosynthesis      5. Goiter  TSH with reflex to Free T4 if abnormal        mammogram is for her to start screening after turning age 39.

## 2024-04-24 DIAGNOSIS — R73.03 PRE-DIABETES: ICD-10-CM

## 2024-04-24 DIAGNOSIS — E66.01 MORBID OBESITY (MULTI): Primary | ICD-10-CM

## 2024-04-25 ENCOUNTER — APPOINTMENT (OUTPATIENT)
Dept: PRIMARY CARE | Facility: CLINIC | Age: 39
End: 2024-04-25
Payer: COMMERCIAL

## 2024-05-15 ENCOUNTER — PATIENT MESSAGE (OUTPATIENT)
Dept: PRIMARY CARE | Facility: CLINIC | Age: 39
End: 2024-05-15
Payer: COMMERCIAL

## 2024-05-15 DIAGNOSIS — E66.01 SEVERE OBESITY (MULTI): Primary | ICD-10-CM

## 2024-05-16 NOTE — TELEPHONE ENCOUNTER
From: Nehal Linton  To: Hilda Gilbert  Sent: 5/15/2024 6:14 PM EDT  Subject: Zepbound 12.5    Hi!    I am ready for my next dose of Zepbound after I take next Monday’s injection.  [facility] in [facility] has 1 more in stock. Since these are so hard to locate, I figured I would get a head start.    [phone] Pharmacy #    Thanks!

## 2024-06-20 ENCOUNTER — PATIENT MESSAGE (OUTPATIENT)
Dept: PRIMARY CARE | Facility: CLINIC | Age: 39
End: 2024-06-20
Payer: COMMERCIAL

## 2024-06-20 DIAGNOSIS — E66.01 SEVERE OBESITY (MULTI): ICD-10-CM

## 2024-06-25 ENCOUNTER — TELEPHONE (OUTPATIENT)
Dept: PRIMARY CARE | Facility: CLINIC | Age: 39
End: 2024-06-25
Payer: COMMERCIAL

## 2024-07-10 ENCOUNTER — LAB (OUTPATIENT)
Dept: LAB | Facility: LAB | Age: 39
End: 2024-07-10
Payer: COMMERCIAL

## 2024-07-10 DIAGNOSIS — Z00.00 PHYSICAL EXAM: ICD-10-CM

## 2024-07-10 DIAGNOSIS — Z11.59 NEED FOR HEPATITIS C SCREENING TEST: ICD-10-CM

## 2024-07-10 DIAGNOSIS — E04.9 GOITER: ICD-10-CM

## 2024-07-10 LAB
ALBUMIN SERPL BCP-MCNC: 3.6 G/DL (ref 3.4–5)
ALP SERPL-CCNC: 67 U/L (ref 33–110)
ALT SERPL W P-5'-P-CCNC: 6 U/L (ref 7–45)
ANION GAP SERPL CALC-SCNC: 14 MMOL/L (ref 10–20)
AST SERPL W P-5'-P-CCNC: 8 U/L (ref 9–39)
BILIRUB SERPL-MCNC: 0.3 MG/DL (ref 0–1.2)
BUN SERPL-MCNC: 13 MG/DL (ref 6–23)
CALCIUM SERPL-MCNC: 9.6 MG/DL (ref 8.6–10.6)
CHLORIDE SERPL-SCNC: 102 MMOL/L (ref 98–107)
CHOLEST SERPL-MCNC: 180 MG/DL (ref 0–199)
CHOLESTEROL/HDL RATIO: 4.6
CO2 SERPL-SCNC: 26 MMOL/L (ref 21–32)
CREAT SERPL-MCNC: 1 MG/DL (ref 0.5–1.05)
EGFRCR SERPLBLD CKD-EPI 2021: 74 ML/MIN/1.73M*2
EST. AVERAGE GLUCOSE BLD GHB EST-MCNC: 103 MG/DL
GLUCOSE SERPL-MCNC: 88 MG/DL (ref 74–99)
HBA1C MFR BLD: 5.2 %
HCV AB SER QL: NONREACTIVE
HDLC SERPL-MCNC: 39.3 MG/DL
LDLC SERPL CALC-MCNC: 127 MG/DL
NON HDL CHOLESTEROL: 141 MG/DL (ref 0–149)
POTASSIUM SERPL-SCNC: 4.5 MMOL/L (ref 3.5–5.3)
PROT SERPL-MCNC: 7.7 G/DL (ref 6.4–8.2)
SODIUM SERPL-SCNC: 137 MMOL/L (ref 136–145)
TRIGL SERPL-MCNC: 69 MG/DL (ref 0–149)
TSH SERPL-ACNC: 1.95 MIU/L (ref 0.44–3.98)
VLDL: 14 MG/DL (ref 0–40)

## 2024-07-10 PROCEDURE — 80061 LIPID PANEL: CPT

## 2024-07-10 PROCEDURE — 83036 HEMOGLOBIN GLYCOSYLATED A1C: CPT

## 2024-07-10 PROCEDURE — 36415 COLL VENOUS BLD VENIPUNCTURE: CPT

## 2024-07-10 PROCEDURE — 80053 COMPREHEN METABOLIC PANEL: CPT

## 2024-07-10 PROCEDURE — 86803 HEPATITIS C AB TEST: CPT

## 2024-07-10 PROCEDURE — 84443 ASSAY THYROID STIM HORMONE: CPT

## 2024-07-27 DIAGNOSIS — E66.01 SEVERE OBESITY (MULTI): ICD-10-CM

## 2024-07-29 ENCOUNTER — PATIENT MESSAGE (OUTPATIENT)
Dept: PRIMARY CARE | Facility: CLINIC | Age: 39
End: 2024-07-29
Payer: COMMERCIAL

## 2024-07-29 DIAGNOSIS — E66.01 SEVERE OBESITY (MULTI): ICD-10-CM

## 2024-07-29 RX ORDER — TIRZEPATIDE 12.5 MG/.5ML
INJECTION, SOLUTION SUBCUTANEOUS
Qty: 4 ML | Refills: 0 | Status: SHIPPED | OUTPATIENT
Start: 2024-07-29

## 2024-08-01 ENCOUNTER — APPOINTMENT (OUTPATIENT)
Dept: OBSTETRICS AND GYNECOLOGY | Facility: CLINIC | Age: 39
End: 2024-08-01
Payer: COMMERCIAL

## 2024-08-01 VITALS
HEIGHT: 67 IN | DIASTOLIC BLOOD PRESSURE: 82 MMHG | SYSTOLIC BLOOD PRESSURE: 120 MMHG | BODY MASS INDEX: 33.27 KG/M2 | WEIGHT: 212 LBS

## 2024-08-01 DIAGNOSIS — Z01.419 WELL WOMAN EXAM WITH ROUTINE GYNECOLOGICAL EXAM: ICD-10-CM

## 2024-08-01 DIAGNOSIS — N89.8 VAGINAL IRRITATION: ICD-10-CM

## 2024-08-01 PROCEDURE — 87624 HPV HI-RISK TYP POOLED RSLT: CPT

## 2024-08-01 PROCEDURE — 87661 TRICHOMONAS VAGINALIS AMPLIF: CPT

## 2024-08-01 PROCEDURE — 87491 CHLMYD TRACH DNA AMP PROBE: CPT

## 2024-08-01 PROCEDURE — 87591 N.GONORRHOEAE DNA AMP PROB: CPT

## 2024-08-01 PROCEDURE — 1036F TOBACCO NON-USER: CPT | Performed by: OBSTETRICS & GYNECOLOGY

## 2024-08-01 PROCEDURE — 87205 SMEAR GRAM STAIN: CPT

## 2024-08-01 PROCEDURE — 99395 PREV VISIT EST AGE 18-39: CPT | Performed by: OBSTETRICS & GYNECOLOGY

## 2024-08-01 PROCEDURE — 3008F BODY MASS INDEX DOCD: CPT | Performed by: OBSTETRICS & GYNECOLOGY

## 2024-08-01 ASSESSMENT — PAIN SCALES - GENERAL: PAINLEVEL: 0-NO PAIN

## 2024-08-01 NOTE — PROGRESS NOTES
Subjective   Patient ID: Nehal Linton is a 38 y.o. female who presents for Well Women Visit (New Patient//Annual exam with pap smear/- PCP orders mammograms//C/o  vaginal irritation/- check stds//Chaperone declined).  HPI  As contained in the chief complaint  Review of Systems  10 systems have been reviewed and are negative and noncontributory to the patient current ailments.    Objective   Physical Exam  Vital signs reviewed    CONSTITUTIONAL- well nourished, well developed, looks like stated age.  She is sitting comfortably on the exam table in no apparent distress  SKIN- normal skin color and pigmentation no visible lesions  EYES- normal external exam  THYROID- symmetrical ,normal size and normal consistency  HEART- RRR without murmur, S3 or S4.  LUNGS- breathing comfortably, no dyspnea  EXTREMITIES- no deformities.  Edema, discoloration, or pain in BLE  NEUROLOGICAL- A/Ox3, conversational   PSYCHIATRIC- alert, pleasant and cordial, age-appropriate, not anxious, or depressed appearing  BREASTS-normal appearance, no skin changes or nipple discharge.  Palpation of the breast and axillae: No palpable mass and no axillary lymphadenopathy  ABDOMEN- soft, non distended, bowel sound normal pitch and intensity,no palpable abnormal masses  GENITOURINARY- External genitalia: Normal.                                 - Uterus: AV/AF NSSC, mobile and nontender                                -The adnexal areas are free of tenderness or mass                                -There are no cervical lesions; there is no cervical motion tenderness                                -Vagina without lesions, mucosa pink and well-hydrated, discharge is physiologic.                                   Inspection of Perianal Area: Normal    Assessment/Plan   Diagnoses and all orders for this visit:  Well woman exam with routine gynecological exam  -     THINPREP PAP TEST  Vaginal irritation  -     Vaginitis Gram Stain For Bacterial Vaginosis +  Yeast  -     C. Trachomatis / N. Gonorrhoeae, Amplified Detection  -     Trichomonas vaginalis, Nucleic Acid Detection  -Baseline mammogram         Alan Wade DO 08/01/24 1:09 PM

## 2024-08-02 LAB
BACTERIAL VAGINOSIS VAG-IMP: NORMAL
C TRACH RRNA SPEC QL NAA+PROBE: NEGATIVE
CLUE CELLS VAG LPF-#/AREA: NORMAL /[LPF]
N GONORRHOEA DNA SPEC QL PROBE+SIG AMP: NEGATIVE
NUGENT SCORE: 5
T VAGINALIS RRNA SPEC QL NAA+PROBE: NEGATIVE
YEAST VAG WET PREP-#/AREA: NORMAL

## 2024-08-05 ENCOUNTER — APPOINTMENT (OUTPATIENT)
Dept: PRIMARY CARE | Facility: CLINIC | Age: 39
End: 2024-08-05
Payer: COMMERCIAL

## 2024-08-05 ENCOUNTER — TELEPHONE (OUTPATIENT)
Dept: OBSTETRICS AND GYNECOLOGY | Facility: CLINIC | Age: 39
End: 2024-08-05

## 2024-08-05 VITALS
OXYGEN SATURATION: 96 % | DIASTOLIC BLOOD PRESSURE: 66 MMHG | TEMPERATURE: 98.4 F | WEIGHT: 211 LBS | BODY MASS INDEX: 33.91 KG/M2 | HEART RATE: 78 BPM | HEIGHT: 66 IN | SYSTOLIC BLOOD PRESSURE: 99 MMHG

## 2024-08-05 DIAGNOSIS — E66.01 SEVERE OBESITY (MULTI): Primary | ICD-10-CM

## 2024-08-05 DIAGNOSIS — E55.9 VITAMIN D DEFICIENCY: ICD-10-CM

## 2024-08-05 PROCEDURE — 99214 OFFICE O/P EST MOD 30 MIN: CPT | Performed by: INTERNAL MEDICINE

## 2024-08-05 PROCEDURE — 1036F TOBACCO NON-USER: CPT | Performed by: INTERNAL MEDICINE

## 2024-08-05 PROCEDURE — 3008F BODY MASS INDEX DOCD: CPT | Performed by: INTERNAL MEDICINE

## 2024-08-05 NOTE — PROGRESS NOTES
"PCP: Hilda Gilbert MD   I have reviewed existing histories, notes, past medical history, surgical history, social history, family history, med list, allergy list, and immunization, and updated.    HPI:   Follow up zepbound for obesity. Has continued to lose weight. No Side effects noted.  Feels well, and health balanced diet and exercises regularly    Lab Results   Component Value Date    WBC 13.4 (H) 01/24/2023    HGB 11.0 (L) 01/24/2023    HCT 37.0 01/24/2023     (H) 01/24/2023    CHOL 180 07/10/2024    TRIG 69 07/10/2024    HDL 39.3 07/10/2024    ALT 6 (L) 07/10/2024    AST 8 (L) 07/10/2024     07/10/2024    K 4.5 07/10/2024     07/10/2024    CREATININE 1.00 07/10/2024    BUN 13 07/10/2024    CO2 26 07/10/2024    TSH 1.95 07/10/2024    INR 1.0 06/27/2022    HGBA1C 5.2 07/10/2024     Physical exam:  BP 99/66   Pulse 78   Temp 36.9 °C (98.4 °F)   Ht 1.683 m (5' 6.25\")   Wt 95.7 kg (211 lb)   LMP 07/20/2024 (Approximate)   SpO2 96%   BMI 33.80 kg/m²    Heart RR  Neck exam showed no mass, lymphadenopathy, or thyroid enlargement, and no carotid bruit.  Lungs clear  No leg edema  The abdomen is soft without tenderness, guarding, mass, rebound or organomegaly. Bowel sounds are normal. No CVA tenderness or inguinal adenopathy noted.      Assessments/orders:   1. Severe obesity (Multi)  CBC, Vitamin B12, Iron and TIBC      2. Vitamin D deficiency  Vitamin D 25-Hydroxy,Total (for eval of Vitamin D levels)           Follow up 4 months. Get labs before the appointment              "

## 2024-08-22 ENCOUNTER — PATIENT MESSAGE (OUTPATIENT)
Dept: PRIMARY CARE | Facility: CLINIC | Age: 39
End: 2024-08-22
Payer: COMMERCIAL

## 2024-08-22 DIAGNOSIS — E66.01 SEVERE OBESITY (MULTI): ICD-10-CM

## 2024-08-22 RX ORDER — TIRZEPATIDE 12.5 MG/.5ML
12.5 INJECTION, SOLUTION SUBCUTANEOUS
Qty: 4 ML | Refills: 0 | Status: SHIPPED | OUTPATIENT
Start: 2024-08-22

## 2024-08-23 ENCOUNTER — APPOINTMENT (OUTPATIENT)
Facility: CLINIC | Age: 39
End: 2024-08-23
Payer: COMMERCIAL

## 2024-09-23 ENCOUNTER — PATIENT MESSAGE (OUTPATIENT)
Dept: PRIMARY CARE | Facility: CLINIC | Age: 39
End: 2024-09-23
Payer: COMMERCIAL

## 2024-09-23 DIAGNOSIS — E66.01 MORBID OBESITY (MULTI): Primary | ICD-10-CM

## 2024-10-01 ENCOUNTER — APPOINTMENT (OUTPATIENT)
Dept: RADIOLOGY | Facility: CLINIC | Age: 39
End: 2024-10-01
Payer: COMMERCIAL

## 2024-10-07 ENCOUNTER — APPOINTMENT (OUTPATIENT)
Dept: RADIOLOGY | Facility: CLINIC | Age: 39
End: 2024-10-07
Payer: COMMERCIAL

## 2024-10-22 ENCOUNTER — PATIENT MESSAGE (OUTPATIENT)
Dept: PRIMARY CARE | Facility: CLINIC | Age: 39
End: 2024-10-22
Payer: COMMERCIAL

## 2024-10-22 DIAGNOSIS — E66.01 MORBID OBESITY (MULTI): ICD-10-CM

## 2024-11-15 ENCOUNTER — HOSPITAL ENCOUNTER (OUTPATIENT)
Dept: RADIOLOGY | Facility: CLINIC | Age: 39
Discharge: HOME | End: 2024-11-15
Payer: COMMERCIAL

## 2024-11-15 VITALS — WEIGHT: 190 LBS | HEIGHT: 67 IN | BODY MASS INDEX: 29.82 KG/M2

## 2024-11-15 DIAGNOSIS — Z12.31 ENCOUNTER FOR SCREENING MAMMOGRAM FOR MALIGNANT NEOPLASM OF BREAST: ICD-10-CM

## 2024-11-15 PROCEDURE — 77067 SCR MAMMO BI INCL CAD: CPT

## 2024-12-05 ENCOUNTER — PATIENT MESSAGE (OUTPATIENT)
Dept: PRIMARY CARE | Facility: CLINIC | Age: 39
End: 2024-12-05
Payer: COMMERCIAL

## 2024-12-05 DIAGNOSIS — E66.01 MORBID OBESITY (MULTI): ICD-10-CM

## 2025-01-15 ENCOUNTER — PATIENT MESSAGE (OUTPATIENT)
Dept: PRIMARY CARE | Facility: CLINIC | Age: 40
End: 2025-01-15
Payer: COMMERCIAL

## 2025-01-15 DIAGNOSIS — E66.01 MORBID OBESITY (MULTI): ICD-10-CM

## 2025-01-28 ENCOUNTER — TELEMEDICINE (OUTPATIENT)
Dept: PRIMARY CARE | Facility: CLINIC | Age: 40
End: 2025-01-28
Payer: COMMERCIAL

## 2025-01-28 DIAGNOSIS — M62.838 MUSCLE SPASM: Primary | ICD-10-CM

## 2025-01-28 PROCEDURE — 99214 OFFICE O/P EST MOD 30 MIN: CPT | Performed by: FAMILY MEDICINE

## 2025-01-28 RX ORDER — NAPROXEN 500 MG/1
500 TABLET ORAL 2 TIMES DAILY PRN
Qty: 60 TABLET | Refills: 0 | Status: SHIPPED | OUTPATIENT
Start: 2025-01-28 | End: 2025-04-28

## 2025-01-28 RX ORDER — CYCLOBENZAPRINE HCL 5 MG
5 TABLET ORAL 3 TIMES DAILY PRN
Qty: 30 TABLET | Refills: 0 | Status: SHIPPED | OUTPATIENT
Start: 2025-01-28 | End: 2025-03-29

## 2025-01-28 NOTE — PATIENT INSTRUCTIONS
"Trial of flexeril-- can cause drowsiness  Ice and heat alternating  Naprosyn as written with food x 5-7 days  Follow up ir worsens or persists    Please send me a Funderat message if you have any questions or concerns.  FOR NON URGENT questions only.  Allow up to 72 hours for response.    If you have prescription issues or other questions you can email   Daryl Deshpande  Digital Health Coordinator, at   [email or web address]     Rest and drink plenty of fluids    Tylenol and or motrin as needed for pain and fever (unless you have been told not to take these because of your personal medical history)    Discussed options and precautions (complaint specific and may include)  Viral versus bacterial infection; use of medications; possible side effects; appropriate over-the-counter medications; possible complications and /or when to follow-up.    if sent for in person care at  or ED,  it was explained why and failure to have \"higher level of care\" further evaluation, and treatment today may lead, but is not limited to negative outcomes, permanent disability, or even death.     All red flags requiring in person care were discussed.    If not sent for in person care today, follow-up with your PCP in 2-3 days, sooner if not  improving.    Follow-up immediately if symptoms worsen. If experiencing symptoms including but not limited to lethargy / chest pain / weakness / dizziness / difficulty breathing please call 911 or go to the emergency department for immediate care.     All patient's questions were answered. Patient has good decision making capacity.  They are alert to person, place, time and situation.  Patient has the ability to communicate choice, understand information, consequences, and reason rationally.      ____________________________________________________________________________________________________________  To connect with a new PCP please visit " [email or web address] or call [phone]
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
## 2025-03-02 DIAGNOSIS — E66.01 MORBID OBESITY (MULTI): ICD-10-CM

## 2025-03-03 RX ORDER — TIRZEPATIDE 15 MG/.5ML
INJECTION, SOLUTION SUBCUTANEOUS
Qty: 2 ML | Refills: 1 | Status: SHIPPED | OUTPATIENT
Start: 2025-03-03

## 2025-03-21 ENCOUNTER — TELEMEDICINE (OUTPATIENT)
Dept: PRIMARY CARE | Facility: CLINIC | Age: 40
End: 2025-03-21
Payer: COMMERCIAL

## 2025-03-21 DIAGNOSIS — R39.9 UTI SYMPTOMS: Primary | ICD-10-CM

## 2025-03-21 PROCEDURE — 99213 OFFICE O/P EST LOW 20 MIN: CPT | Performed by: FAMILY MEDICINE

## 2025-03-21 RX ORDER — NITROFURANTOIN 25; 75 MG/1; MG/1
100 CAPSULE ORAL 2 TIMES DAILY
Qty: 10 CAPSULE | Refills: 0 | Status: SHIPPED | OUTPATIENT
Start: 2025-03-21 | End: 2025-03-26

## 2025-03-21 NOTE — PROGRESS NOTES
Subjective   Patient ID: Nehal Linton is a 39 y.o. female who presents for UTI      HPI     39-year-old [facility] complaining of 2-day history of urinary frequency, urgency, incomplete emptying and pressure.  Denies any dysuria, gross hematuria, fevers, chills, flank pain.  Has a history of UTIs in the past.  Not recently.    Review of Systems  ROS as stated in HPI  Objective   There were no vitals taken for this visit.    Physical Exam  Virtual visit so no physical exam was done.  Patient appears alert and oriented and in no acute distress.      Assessment/Plan   Problem List Items Addressed This Visit    None  Visit Diagnoses         Codes    UTI symptoms    -  Primary R39.9    Relevant Medications    nitrofurantoin, macrocrystal-monohydrate, (Macrobid) 100 mg capsule        rx macrobid  Follow-up for in person visit and urine culture if symptoms persist or worsen.    This visit was completed via VIRTUAL visit. All issues as above were discussed and addressed but no physical exam or vital signs were performed. If it was felt that the patient should be evaluated in clinic then they were directed there. The patient verbally consented to visit.

## 2025-04-02 ENCOUNTER — APPOINTMENT (OUTPATIENT)
Dept: PRIMARY CARE | Facility: CLINIC | Age: 40
End: 2025-04-02
Payer: COMMERCIAL

## 2025-04-02 VITALS
OXYGEN SATURATION: 97 % | SYSTOLIC BLOOD PRESSURE: 102 MMHG | BODY MASS INDEX: 28.28 KG/M2 | DIASTOLIC BLOOD PRESSURE: 58 MMHG | WEIGHT: 176 LBS | HEIGHT: 66 IN | HEART RATE: 91 BPM

## 2025-04-02 DIAGNOSIS — E66.01 CLASS 3 SEVERE OBESITY DUE TO EXCESS CALORIES WITHOUT SERIOUS COMORBIDITY WITH BODY MASS INDEX (BMI) OF 40.0 TO 44.9 IN ADULT: ICD-10-CM

## 2025-04-02 DIAGNOSIS — Z00.00 PHYSICAL EXAM: Primary | ICD-10-CM

## 2025-04-02 DIAGNOSIS — J45.40 MODERATE PERSISTENT ASTHMATIC BRONCHITIS WITHOUT COMPLICATION (HHS-HCC): ICD-10-CM

## 2025-04-02 DIAGNOSIS — E66.813 CLASS 3 SEVERE OBESITY DUE TO EXCESS CALORIES WITHOUT SERIOUS COMORBIDITY WITH BODY MASS INDEX (BMI) OF 40.0 TO 44.9 IN ADULT: ICD-10-CM

## 2025-04-02 PROCEDURE — 90677 PCV20 VACCINE IM: CPT | Performed by: INTERNAL MEDICINE

## 2025-04-02 PROCEDURE — 1036F TOBACCO NON-USER: CPT | Performed by: INTERNAL MEDICINE

## 2025-04-02 PROCEDURE — 99396 PREV VISIT EST AGE 40-64: CPT | Performed by: INTERNAL MEDICINE

## 2025-04-02 PROCEDURE — 3008F BODY MASS INDEX DOCD: CPT | Performed by: INTERNAL MEDICINE

## 2025-04-02 PROCEDURE — 90471 IMMUNIZATION ADMIN: CPT | Performed by: INTERNAL MEDICINE

## 2025-04-02 ASSESSMENT — PATIENT HEALTH QUESTIONNAIRE - PHQ9
2. FEELING DOWN, DEPRESSED OR HOPELESS: NOT AT ALL
SUM OF ALL RESPONSES TO PHQ9 QUESTIONS 1 AND 2: 0
1. LITTLE INTEREST OR PLEASURE IN DOING THINGS: NOT AT ALL

## 2025-04-02 NOTE — PROGRESS NOTES
"Hilda Gilbert MD  SUBJECTIVE:     Nehal Linton is a 39 y.o. female presenting for her annual physical/wellness.    Also refills for zepbound.  Her initial BMI was 43.  Has lost good amount of weight, on zepbound.  She had to stop for a couple of weeks because the last rx was deemed not covered, and she and her pharmacy could not figure out why it was not covered after having been covered for one year.   So we will need to restart at a lower dose.  After we sent the new rx, she was called by pharmacy that it would be only 75$ being her copay. So that is good  She will continue diet and exercises    Colon screening:  na  Last pap: 2024  Last mammogram: 2024  Dexa na  Vaccines she agrees to take prevnar 20. She has asthma    Diet:   healthy in general  Exercises: regularly  Lab Results   Component Value Date    HGBA1C 5.2 07/10/2024     Lab Results   Component Value Date    CREATININE 1.00 07/10/2024     Lab Results   Component Value Date    WBC 13.4 (H) 01/24/2023    HGB 11.0 (L) 01/24/2023    HCT 37.0 01/24/2023    MCV 78 (L) 01/24/2023     (H) 01/24/2023     Lab Results   Component Value Date    CHOL 180 07/10/2024    CHOL 164 01/18/2023    CHOL 182 03/02/2019     Lab Results   Component Value Date    HDL 39.3 07/10/2024    HDL 57.2 01/18/2023    HDL 49.0 03/02/2019     Lab Results   Component Value Date    LDLCALC 127 (H) 07/10/2024     Lab Results   Component Value Date    TRIG 69 07/10/2024    TRIG 59 01/18/2023    TRIG 73 03/02/2019         ROS:   Feeling well. No dyspnea or chest pain on exertion. No abdominal pain, change in bowel habits, black or bloody stools. No urinary tract or  symptoms.  No breast concerns. No neurological complaints.    OBJECTIVE:   The patient appears well, alert, oriented x 3, in no distress.   /58   Pulse 91   Ht 1.676 m (5' 6\")   Wt 79.8 kg (176 lb)   SpO2 97%   BMI 28.41 kg/m²   ENT normal.  Neck supple. No adenopathy or thyromegaly. MACARIO. Lungs are clear, " good air entry, no wheezes, rhonchi or rales. S1 and S2 normal, no murmurs, regular rate and rhythm. Abdomen is soft without tenderness, guarding, mass or organomegaly.  exam deferred to Gyn. Extremities show no edema, normal peripheral pulses. Neurological is normal without focal findings.    Assessment & Plan  Physical exam         Moderate persistent asthmatic bronchitis without complication ([facility])         Class 3 severe obesity due to excess calories without serious comorbidity with body mass index (BMI) of 40.0 to 44.9 in adult    Orders:    tirzepatide, weight loss, (Zepbound) 10 mg/0.5 mL injection; Inject 10 mg under the skin every 7 days.

## 2025-05-27 ENCOUNTER — PATIENT MESSAGE (OUTPATIENT)
Dept: PRIMARY CARE | Facility: CLINIC | Age: 40
End: 2025-05-27
Payer: COMMERCIAL

## 2025-05-27 DIAGNOSIS — E66.813 CLASS 3 SEVERE OBESITY DUE TO EXCESS CALORIES WITHOUT SERIOUS COMORBIDITY WITH BODY MASS INDEX (BMI) OF 40.0 TO 44.9 IN ADULT: Primary | ICD-10-CM

## 2025-06-24 ENCOUNTER — PATIENT MESSAGE (OUTPATIENT)
Dept: PRIMARY CARE | Facility: CLINIC | Age: 40
End: 2025-06-24
Payer: COMMERCIAL

## 2025-06-24 DIAGNOSIS — R11.0 NAUSEA: Primary | ICD-10-CM

## 2025-06-24 RX ORDER — ONDANSETRON 8 MG/1
8 TABLET, ORALLY DISINTEGRATING ORAL EVERY 8 HOURS PRN
Qty: 20 TABLET | Refills: 0 | Status: SHIPPED | OUTPATIENT
Start: 2025-06-24 | End: 2025-07-01

## 2025-07-30 ENCOUNTER — APPOINTMENT (OUTPATIENT)
Dept: PRIMARY CARE | Facility: CLINIC | Age: 40
End: 2025-07-30
Payer: COMMERCIAL

## 2025-07-30 VITALS
BODY MASS INDEX: 27.16 KG/M2 | HEART RATE: 104 BPM | HEIGHT: 66 IN | SYSTOLIC BLOOD PRESSURE: 95 MMHG | TEMPERATURE: 98.5 F | OXYGEN SATURATION: 98 % | DIASTOLIC BLOOD PRESSURE: 57 MMHG | WEIGHT: 169 LBS

## 2025-07-30 DIAGNOSIS — R10.33 PERIUMBILICAL ABDOMINAL PAIN: Primary | ICD-10-CM

## 2025-07-30 DIAGNOSIS — R30.0 DYSURIA: ICD-10-CM

## 2025-07-30 LAB
POC APPEARANCE, URINE: CLEAR
POC BILIRUBIN, URINE: NEGATIVE
POC BLOOD, URINE: ABNORMAL
POC COLOR, URINE: YELLOW
POC GLUCOSE, URINE: NEGATIVE MG/DL
POC KETONES, URINE: NEGATIVE MG/DL
POC LEUKOCYTES, URINE: NEGATIVE
POC NITRITE,URINE: NEGATIVE
POC PH, URINE: 6.5 PH
POC PROTEIN, URINE: ABNORMAL MG/DL
POC SPECIFIC GRAVITY, URINE: 1.02
POC UROBILINOGEN, URINE: 1 EU/DL

## 2025-07-30 PROCEDURE — 3008F BODY MASS INDEX DOCD: CPT | Performed by: INTERNAL MEDICINE

## 2025-07-30 PROCEDURE — 99214 OFFICE O/P EST MOD 30 MIN: CPT | Performed by: INTERNAL MEDICINE

## 2025-07-30 PROCEDURE — 81003 URINALYSIS AUTO W/O SCOPE: CPT | Performed by: INTERNAL MEDICINE

## 2025-07-30 PROCEDURE — 1036F TOBACCO NON-USER: CPT | Performed by: INTERNAL MEDICINE

## 2025-07-30 RX ORDER — OMEPRAZOLE 20 MG/1
20 CAPSULE, DELAYED RELEASE ORAL DAILY
Qty: 30 CAPSULE | Refills: 1 | Status: SHIPPED | OUTPATIENT
Start: 2025-07-30 | End: 2026-01-26

## 2025-07-30 NOTE — PROGRESS NOTES
"PCP: Hilda Gilbert MD   I have reviewed existing histories, notes, past medical history, surgical history, social history, family history, med list, allergy list, and immunization, and updated.    HPI:   Here for Abdominal pain, of 3 weeks duration, it is daily, and worse with eating or drinking anything. Some nausea following zepbound injection. Helped by Zofran.   Pain is slightly better before next dose of zepbound.  No diarrhea  Some constipation   For two days, some urinary pressure, and urine smells strong.  No Fever and chills     history of a laparoscopic ventral hernia repair at [facility] performed 8 years ago.       Lab Results   Component Value Date    WBC 13.4 (H) 01/24/2023    HGB 11.0 (L) 01/24/2023    HCT 37.0 01/24/2023     (H) 01/24/2023    CHOL 180 07/10/2024    TRIG 69 07/10/2024    HDL 39.3 07/10/2024    ALT 6 (L) 07/10/2024    AST 8 (L) 07/10/2024     07/10/2024    K 4.5 07/10/2024     07/10/2024    CREATININE 1.00 07/10/2024    BUN 13 07/10/2024    CO2 26 07/10/2024    TSH 1.95 07/10/2024    INR 1.0 06/27/2022    HGBA1C 5.2 07/10/2024     Physical exam:  BP 95/57   Pulse 104   Temp 36.9 °C (98.5 °F)   Ht 1.683 m (5' 6.25\")   Wt 76.7 kg (169 lb)   SpO2 98%   BMI 27.07 kg/m²    No acute distress  Heart RR  Lungs clear  Mild tenderness at mid abdomen around umbilicus, more so below it.   normal bs.   No mass felt  No leg edema.    Assessments/orders:   Assessment & Plan  Periumbilical abdominal pain  Her symptoms get worse with zepbound injection, so should stop taking zepbound for now. If symptoms do not completely resolve, should let us know, and may need further testing with CT scan, EGD etc.  Does not sound like PUD, she does have GERD, will switch from Pepcid to omeprazole  Orders:    Comprehensive Metabolic Panel; Future    CBC and Auto Differential; Future    Amylase; Future    Lipase; Future    omeprazole (PriLOSEC) 20 mg DR capsule; Take 1 capsule (20 mg) by " mouth once daily. Do not crush or chew.    Dysuria  Symptoms very mild, urine analysis showed minor findings. Will culture urine, and treat if possible.   Orders:    POCT UA Automated manually resulted    Urine Culture

## 2025-07-31 LAB
ALBUMIN SERPL-MCNC: 3.1 G/DL (ref 3.6–5.1)
ALP SERPL-CCNC: 65 U/L (ref 31–125)
ALT SERPL-CCNC: 6 U/L (ref 6–29)
AMYLASE SERPL-CCNC: 32 U/L (ref 21–101)
ANION GAP SERPL CALCULATED.4IONS-SCNC: 10 MMOL/L (CALC) (ref 7–17)
AST SERPL-CCNC: 8 U/L (ref 10–30)
BASOPHILS # BLD AUTO: 22 CELLS/UL (ref 0–200)
BASOPHILS NFR BLD AUTO: 0.2 %
BILIRUB SERPL-MCNC: 0.2 MG/DL (ref 0.2–1.2)
BUN SERPL-MCNC: 10 MG/DL (ref 7–25)
CALCIUM SERPL-MCNC: 9.2 MG/DL (ref 8.6–10.2)
CHLORIDE SERPL-SCNC: 99 MMOL/L (ref 98–110)
CO2 SERPL-SCNC: 25 MMOL/L (ref 20–32)
CREAT SERPL-MCNC: 0.84 MG/DL (ref 0.5–0.99)
EGFRCR SERPLBLD CKD-EPI 2021: 90 ML/MIN/1.73M2
EOSINOPHIL # BLD AUTO: 259 CELLS/UL (ref 15–500)
EOSINOPHIL NFR BLD AUTO: 2.4 %
ERYTHROCYTE [DISTWIDTH] IN BLOOD BY AUTOMATED COUNT: 15.8 % (ref 11–15)
GLUCOSE SERPL-MCNC: 69 MG/DL (ref 65–99)
HCT VFR BLD AUTO: 29.3 % (ref 35–45)
HGB BLD-MCNC: 8.2 G/DL (ref 11.7–15.5)
LIPASE SERPL-CCNC: 34 U/L (ref 7–60)
LYMPHOCYTES # BLD AUTO: 1836 CELLS/UL (ref 850–3900)
LYMPHOCYTES NFR BLD AUTO: 17 %
MCH RBC QN AUTO: 19.5 PG (ref 27–33)
MCHC RBC AUTO-ENTMCNC: 28 G/DL (ref 32–36)
MCV RBC AUTO: 69.6 FL (ref 80–100)
MONOCYTES # BLD AUTO: 1048 CELLS/UL (ref 200–950)
MONOCYTES NFR BLD AUTO: 9.7 %
MORPHOLOGY BLD-IMP: ABNORMAL
NEUTROPHILS # BLD AUTO: 7636 CELLS/UL (ref 1500–7800)
NEUTROPHILS NFR BLD AUTO: 70.7 %
PLATELET # BLD AUTO: 861 THOUSAND/UL (ref 140–400)
PMV BLD REES-ECKER: 8.6 FL (ref 7.5–12.5)
POTASSIUM SERPL-SCNC: 4.9 MMOL/L (ref 3.5–5.3)
PROT SERPL-MCNC: 8.1 G/DL (ref 6.1–8.1)
RBC # BLD AUTO: 4.21 MILLION/UL (ref 3.8–5.1)
SODIUM SERPL-SCNC: 134 MMOL/L (ref 135–146)
WBC # BLD AUTO: 10.8 THOUSAND/UL (ref 3.8–10.8)

## 2025-08-01 LAB — BACTERIA UR CULT: NORMAL

## 2025-08-19 ENCOUNTER — PATIENT MESSAGE (OUTPATIENT)
Dept: PRIMARY CARE | Facility: CLINIC | Age: 40
End: 2025-08-19
Payer: COMMERCIAL

## 2025-08-19 DIAGNOSIS — E66.813 CLASS 3 SEVERE OBESITY DUE TO EXCESS CALORIES WITHOUT SERIOUS COMORBIDITY WITH BODY MASS INDEX (BMI) OF 40.0 TO 44.9 IN ADULT: ICD-10-CM
